# Patient Record
Sex: MALE | Race: BLACK OR AFRICAN AMERICAN | Employment: UNEMPLOYED | URBAN - METROPOLITAN AREA
[De-identification: names, ages, dates, MRNs, and addresses within clinical notes are randomized per-mention and may not be internally consistent; named-entity substitution may affect disease eponyms.]

---

## 2017-08-17 ENCOUNTER — APPOINTMENT (OUTPATIENT)
Dept: OCCUPATIONAL MEDICINE | Age: 27
End: 2017-08-17
Attending: FAMILY MEDICINE

## 2025-03-23 ENCOUNTER — HOSPITAL ENCOUNTER (EMERGENCY)
Facility: HOSPITAL | Age: 35
Discharge: HOME | End: 2025-03-23
Attending: EMERGENCY MEDICINE
Payer: MEDICAID

## 2025-03-23 VITALS
RESPIRATION RATE: 20 BRPM | BODY MASS INDEX: 28.25 KG/M2 | SYSTOLIC BLOOD PRESSURE: 131 MMHG | WEIGHT: 180 LBS | HEART RATE: 108 BPM | DIASTOLIC BLOOD PRESSURE: 74 MMHG | OXYGEN SATURATION: 98 % | TEMPERATURE: 97 F | HEIGHT: 67 IN

## 2025-03-23 DIAGNOSIS — K08.89 PAIN, DENTAL: Primary | ICD-10-CM

## 2025-03-23 PROCEDURE — 99284 EMERGENCY DEPT VISIT MOD MDM: CPT

## 2025-03-23 PROCEDURE — 99283 EMERGENCY DEPT VISIT LOW MDM: CPT

## 2025-03-23 RX ORDER — HYDROCODONE BITARTRATE AND ACETAMINOPHEN 5; 325 MG/1; MG/1
1 TABLET ORAL EVERY 6 HOURS PRN
Qty: 10 TABLET | Refills: 0 | Status: SHIPPED | OUTPATIENT
Start: 2025-03-23 | End: 2025-03-28

## 2025-03-23 RX ORDER — PENICILLIN V POTASSIUM 500 MG/1
500 TABLET, FILM COATED ORAL 4 TIMES DAILY
Qty: 20 TABLET | Refills: 0 | Status: SHIPPED | OUTPATIENT
Start: 2025-03-23 | End: 2025-03-28

## 2025-03-23 RX ORDER — HYDROCODONE BITARTRATE AND ACETAMINOPHEN 5; 325 MG/1; MG/1
2 TABLET ORAL ONCE
Status: COMPLETED | OUTPATIENT
Start: 2025-03-23 | End: 2025-03-23

## 2025-03-23 RX ORDER — HYDROCODONE BITARTRATE AND ACETAMINOPHEN 5; 325 MG/1; MG/1
1-2 TABLET ORAL EVERY 6 HOURS PRN
Qty: 20 TABLET | Refills: 0 | Status: SHIPPED | OUTPATIENT
Start: 2025-03-23 | End: 2025-03-28

## 2025-03-23 RX ORDER — PENICILLIN V POTASSIUM 500 MG/1
500 TABLET, FILM COATED ORAL ONCE
Status: COMPLETED | OUTPATIENT
Start: 2025-03-23 | End: 2025-03-23

## 2025-03-23 NOTE — ED PROVIDER NOTES
Patient Seen in: [facility] Emergency Department      History     Chief Complaint   Patient presents with    Dental Problem     Stated Complaint: dental pain x2 days    Subjective:   HPI      Patient is a 34-year-old male presents to ED for evaluation of dental pain.  Patient states he has been having problems with his teeth.  He has been having intermittent pain with his upper and lower teeth on the right on the molars and premolars.  Seen by dentist couple months ago and referred to an oral surgeon and the dentist stated he needed multiple dental extractions.  He did have x-rays performed at that time.  He complains of pain that got worse tonight in his right upper and lower teeth.  Denies fever.  Sensitive to hot and cold.    Objective:     No pertinent past medical history.            No pertinent past surgical history.              No pertinent social history.                Physical Exam     ED Triage Vitals [03/23/25 0214]   /74   Pulse 108   Resp 20   Temp 97.1 °F (36.2 °C)   Temp src Temporal   SpO2 98 %   O2 Device None (Room air)       Current Vitals:   Vital Signs  BP: 131/74  Pulse: 108  Resp: 20  Temp: 97.1 °F (36.2 °C)  Temp src: Temporal    Oxygen Therapy  SpO2: 98 %  O2 Device: None (Room air)        Physical Exam  Constitutional: Well-appearing in no acute distress  HEENT: Patient has no evidence for intraoral abscess.  There is no facial swelling.  Tenderness to percussion of teeth #2, 3, 4 as well as teeth #31, 30, 29.  Posterior pharynx is unremarkable  Neck: Supple without any lymphadenopathy    ED Course   Labs Reviewed - No data to display         Medications   HYDROcodone-acetaminophen (Norco) 5-325 MG per tab 2 tablet (2 tablets Oral Given 3/23/25 0247)   penicillin v potassium (Veetid) tab 500 mg (500 mg Oral Given 3/23/25 0246)            MDM      Patient is a 34-year-old male presents to ED for evaluation of dental pain.  Differential pulpitis, cavity, dental abscess.  No  evidence for abscess.  Likely pulpitis.  Recommend oral surgery referral.  Patient was given Norco and penicillin here.    Patient was screened and evaluated during this visit.   As a treating physician attending to the patient, I determined, within reasonable clinical confidence and prior to discharge, that an emergency medical condition was not or was no longer present.  There was no indication for further evaluation, treatment or admission on an emergency basis.  Comprehensive verbal and written discharge and follow-up instructions were provided to help prevent relapse or worsening.  Patient was instructed to follow-up with her primary care provider for further evaluation and treatment, but to return immediately to the ER for worsening, concerning, new, changing or persisting symptoms.  I discussed the case with the patient and they had no questions, complaints, or concerns.  Patient felt comfortable going home.            MDM    Disposition and Plan     Clinical Impression:  1. Pain, dental         Disposition:  Discharge  3/23/2025  2:41 am    Follow-up:  Silverio Burroughs DDS, MD  [address] [place] [address]  [phone]    Follow up in 1 week(s)            Medications Prescribed:  Discharge Medication List as of 3/23/2025  2:57 AM        START taking these medications    Details   HYDROcodone-acetaminophen 5-325 MG Oral Tab Take 1-2 tablets by mouth every 6 (six) hours as needed for Pain., Normal, Disp-20 tablet, R-0      penicillin v potassium 500 MG Oral Tab Take 1 tablet (500 mg total) by mouth 4 (four) times daily for 5 days., Normal, Disp-20 tablet, R-0                 Supplementary Documentation:                                                                 9

## 2025-03-23 NOTE — DISCHARGE INSTRUCTIONS
Do Not drink alcohol or drive on Norco (TYLENOL/HYDROCODONE) as it may make you drowsy.  Do not take extra Tylenol with Norco as Norco does contain Tylenol

## 2025-03-23 NOTE — ED INITIAL ASSESSMENT (HPI)
Patient to ED with c/o dental pain on bilateral upper teeth and right lower side . Patient states he feels an abscess in his mouth and the pain is excruciating. Patient took tylenol at 1hour ago with no relief